# Patient Record
Sex: FEMALE | Race: OTHER | URBAN - METROPOLITAN AREA
[De-identification: names, ages, dates, MRNs, and addresses within clinical notes are randomized per-mention and may not be internally consistent; named-entity substitution may affect disease eponyms.]

---

## 2022-09-02 ENCOUNTER — EMERGENCY (EMERGENCY)
Facility: HOSPITAL | Age: 47
LOS: 0 days | Discharge: HOME | End: 2022-09-03
Attending: EMERGENCY MEDICINE

## 2022-09-02 VITALS
HEART RATE: 112 BPM | TEMPERATURE: 99 F | HEIGHT: 65 IN | OXYGEN SATURATION: 95 % | RESPIRATION RATE: 16 BRPM | WEIGHT: 156.09 LBS | DIASTOLIC BLOOD PRESSURE: 131 MMHG | SYSTOLIC BLOOD PRESSURE: 193 MMHG

## 2022-09-02 DIAGNOSIS — Z95.5 PRESENCE OF CORONARY ANGIOPLASTY IMPLANT AND GRAFT: ICD-10-CM
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
## 2022-09-02 DIAGNOSIS — T46.5X6A UNDERDOSING OF OTHER ANTIHYPERTENSIVE DRUGS, INITIAL ENCOUNTER: ICD-10-CM

## 2022-09-02 DIAGNOSIS — R56.9 UNSPECIFIED CONVULSIONS: ICD-10-CM

## 2022-09-02 DIAGNOSIS — I10 ESSENTIAL (PRIMARY) HYPERTENSION: ICD-10-CM

## 2022-09-02 LAB — GLUCOSE BLDC GLUCOMTR-MCNC: 132 MG/DL — HIGH (ref 70–99)

## 2022-09-02 PROCEDURE — 99284 EMERGENCY DEPT VISIT MOD MDM: CPT

## 2022-09-02 PROCEDURE — 93010 ELECTROCARDIOGRAM REPORT: CPT

## 2022-09-02 NOTE — ED ADULT TRIAGE NOTE - CHIEF COMPLAINT QUOTE
BIBA for witnessed seizure around 2145 first one  while fishing and another one less than an hour ago  hx of htn, seizure first one last year BIBA for witnessed seizure around 2145 while fishing and another one less than an hour ago inside the car. patient states has not taken BP meds for 5 weeks  hx of htn, seizure first one last year

## 2022-09-03 VITALS
TEMPERATURE: 98 F | SYSTOLIC BLOOD PRESSURE: 170 MMHG | DIASTOLIC BLOOD PRESSURE: 105 MMHG | HEART RATE: 87 BPM | RESPIRATION RATE: 19 BRPM | OXYGEN SATURATION: 99 %

## 2022-09-03 DIAGNOSIS — Z95.5 PRESENCE OF CORONARY ANGIOPLASTY IMPLANT AND GRAFT: Chronic | ICD-10-CM

## 2022-09-03 PROCEDURE — 70450 CT HEAD/BRAIN W/O DYE: CPT | Mod: 26,MA

## 2022-09-03 RX ORDER — BACLOFEN 100 %
0 POWDER (GRAM) MISCELLANEOUS
Qty: 0 | Refills: 0 | DISCHARGE

## 2022-09-03 RX ORDER — LISINOPRIL 2.5 MG/1
20 TABLET ORAL ONCE
Refills: 0 | Status: COMPLETED | OUTPATIENT
Start: 2022-09-03 | End: 2022-09-03

## 2022-09-03 RX ORDER — AMLODIPINE BESYLATE 2.5 MG/1
10 TABLET ORAL ONCE
Refills: 0 | Status: COMPLETED | OUTPATIENT
Start: 2022-09-03 | End: 2022-09-03

## 2022-09-03 RX ORDER — AMLODIPINE BESYLATE 2.5 MG/1
1 TABLET ORAL
Qty: 30 | Refills: 0
Start: 2022-09-03 | End: 2022-10-02

## 2022-09-03 RX ORDER — AMLODIPINE BESYLATE 2.5 MG/1
1 TABLET ORAL
Qty: 0 | Refills: 0 | DISCHARGE

## 2022-09-03 RX ORDER — LISINOPRIL 2.5 MG/1
1 TABLET ORAL
Qty: 0 | Refills: 0 | DISCHARGE

## 2022-09-03 RX ORDER — LISINOPRIL 2.5 MG/1
1 TABLET ORAL
Qty: 30 | Refills: 0
Start: 2022-09-03 | End: 2022-10-02

## 2022-09-03 RX ORDER — HYDROCHLOROTHIAZIDE 25 MG
12.5 TABLET ORAL ONCE
Refills: 0 | Status: COMPLETED | OUTPATIENT
Start: 2022-09-03 | End: 2022-09-03

## 2022-09-03 RX ADMIN — Medication 12.5 MILLIGRAM(S): at 01:52

## 2022-09-03 RX ADMIN — AMLODIPINE BESYLATE 10 MILLIGRAM(S): 2.5 TABLET ORAL at 01:52

## 2022-09-03 RX ADMIN — Medication 0.1 MILLIGRAM(S): at 02:26

## 2022-09-03 RX ADMIN — LISINOPRIL 20 MILLIGRAM(S): 2.5 TABLET ORAL at 01:52

## 2022-09-03 NOTE — ED ADULT NURSE REASSESSMENT NOTE - NS ED NURSE REASSESS COMMENT FT1
Pt placed on seizure precautions.
covering for primary nurse, vitals updated, pt on seizure precautions, famiyl at bedside, BP medications ordered, will continue to monitor

## 2022-09-03 NOTE — ED PROVIDER NOTE - CARE PROVIDER_API CALL
Samy Galvez  NEUROLOGY  [address], [place] [id number]  Phone: [phone]  Fax: [phone]  Follow Up Time: 1-3 Days

## 2022-09-03 NOTE — ED PROVIDER NOTE - CLINICAL SUMMARY MEDICAL DECISION MAKING FREE TEXT BOX
Ddx: Known seizure hx/ hypertensive urgency in setting of med noncompliance, normal mental status/ pt denies alcohol abuse, withdrawal symptoms  Plan: Ct head, bp control, neurology f/u, reassess

## 2022-09-03 NOTE — ED PROVIDER NOTE - PATIENT PORTAL LINK FT
You can access the FollowMyHealth Patient Portal offered by [facility] by registering at the following website: [email or web address]. By joining [facility]’s FollowMyHealth portal, you will also be able to view your health information using other applications (apps) compatible with our system.

## 2022-09-03 NOTE — ED ADULT NURSE NOTE - CHIEF COMPLAINT QUOTE
BIBA for witnessed seizure around 2145 while fishing and another one less than an hour ago inside the car. patient states has not taken BP meds for 5 weeks  hx of htn, seizure first one last year

## 2022-09-03 NOTE — ED PROVIDER NOTE - OBJECTIVE STATEMENT
Pt is a 46 yo lady with a pmhx of HTN, szr who presents to the ED with seizure. She was out with family fishing and felt a seizure coming on, and was caught before she hit ground. Did recover full mental status, but on way home, had another seizure. No tongue biting no urinary incontinence. Has had seizure before in the past, had CT but no further workup. She is visiting from [place], and has not been taking her BP medications. Does drink on weekends, but not daily drinker. No other complaints, and is back to full normal mental status. No chest pain, no sob, no headache.

## 2022-09-03 NOTE — ED ADULT NURSE NOTE - OBJECTIVE STATEMENT
Pt AAOx4. BIBA, presents to ED BIBA for witnessed seizure around 2145 while fishing (lasted approximately 5 minutes per daughter) and another seizure less than 15 minutes after the first one on the car ride home (lasted approximately 8 minutes per daughter). Denies LOC, head trauma, headache, dizziness, weakness, blurred vision, diplopia, photophobia, phonophobia, nausea, vomiting, chest pain, shortness of breath, numbness/tingling. No facial droop, symmetrical smile, no tongue deviation, no drooling, bilateral upper and lower sensation intact, bilateral equal  strength, clear speech, full ROM. No neuro deficits noted at this time. Respirations equal and unlabored, no acute distress noted at this time. Pt placed on cardiac monitor at bedside.    Pt states she has not taken her HTN medications in about 3 weeks "because my blood pressure wasn't high". Educated patient on the importance of continuing her blood pressure medication, verbalized understanding.    PERRLA OU; brisk & equal pupillary rxn;   OD: 3mm    OS: 3mm Pt AAOx4. BIBA, presents to ED for witnessed seizure around 2145 while fishing (lasted approximately 5 minutes per daughter) and another seizure less than 15 minutes after the first one on the car ride home (lasted approximately 8 minutes per daughter). Denies LOC, head trauma, headache, dizziness, weakness, blurred vision, diplopia, photophobia, phonophobia, nausea, vomiting, chest pain, shortness of breath, numbness/tingling. No facial droop, symmetrical smile, no tongue deviation, no drooling, bilateral upper and lower sensation intact, bilateral equal  strength, clear speech, full ROM. No neuro deficits noted at this time. Respirations equal and unlabored, no acute distress noted at this time. Pt placed on cardiac monitor at bedside.    Pt states she has not taken her HTN medications in about 3 weeks "because my blood pressure wasn't high". Educated patient on the importance of continuing her blood pressure medication, verbalized understanding.    PERRLA OU; brisk & equal pupillary rxn;   OD: 3mm    OS: 3mm

## 2022-09-03 NOTE — ED PROVIDER NOTE - PROGRESS NOTE DETAILS
Pt is asymptomatic, CT head negative, and pt is awake and laughing with family. Pt has f/u back home in [place], wishes to leave, and return precautions provided. Will refill bp medications to take while in [place].

## 2022-11-16 NOTE — ED PROVIDER NOTE - CROS ED NEURO POS
Erivedge Pregnancy And Lactation Text: This medication is Pregnancy Category X and is absolutely contraindicated during pregnancy. It is unknown if it is excreted in breast milk. SEIZURES

## 2023-03-12 NOTE — ED PROVIDER NOTE - WET READ LAUNCH FT
Sinusitis likely viral however I would start antibiotics for Ear infection, there will be coverage for bacterial sinus infections.   Ibuprofen for pain, I would avoid dayquil and nyquil  I would consider nasal steroid like Flonase or Nasacort which can be purchased otc.       *-*-*-*-*-*-*-  [facility]    Monday - Friday 8 a.m. - 8 p.m.  Saturday - Sunday (and holidays) 8 a.m. - 4 p.m.  *-*-*-*-*-*-*-  [email or web address]  See current wait times for [facility] in real-time!  Reserve your waiting-room spot in line!   Receive text/email messages if our wait times are long,  so that you can do your waiting at home or work, instead of in our waiting room.     Note: This system does not guarantee an \"appointment time\" to see a provider. Also, patients may be called out of the waiting room \"ahead of turn\" in emergency situations, at discretion of Urgent Care staff.  ----------------  Thank you for choosing [facility] today. We hope you had a pleasant experience and we look forward to serving your future needs.    If you have any questions about your VISIT, please call [phone].    If you have any questions about your BILL, please call [phone].    If you need a copy of your MEDICAL RECORD, please call [phone].    If you receive a survey in the mail about today's services, we hope that you will take a few minutes to let us know about your experience.  --------------------------------------------------------------------------------------------------------------  UNLESS OTHERWISE INSTRUCTED BY YOUR URGENT CARE PROVIDER TODAY, all follow-up for your medical issues should be managed by your primary care provider. The Urgent Care does not manage chronic medical issues or refill medications. You are responsible for scheduling and keeping any necessary follow-up visits with your primary care provider after this visit today.    --------------------------------------------------------------------------------------------------------------  IF YOU WERE PRESCRIBED AN ANTIBIOTIC TODAY: We recommend taking an over-the-counter probiotic (Such as Florajen 3 for adults, or [id number] for children -- AVAILABLE IN THE [facility] and other local pharmacies too) once a day for the entire duration of your antibiotics, and continuing it for 2 weeks after the antibiotics are finished. This will help reduce your chance of developing antibiotic-related diarrhea and/or yeast infections.  --------------------------------------------------------------------------------------------------------------          There are no Wet Read(s) to document.